# Patient Record
Sex: FEMALE | Race: BLACK OR AFRICAN AMERICAN | HISPANIC OR LATINO | Employment: UNEMPLOYED | URBAN - METROPOLITAN AREA
[De-identification: names, ages, dates, MRNs, and addresses within clinical notes are randomized per-mention and may not be internally consistent; named-entity substitution may affect disease eponyms.]

---

## 2024-02-03 PROCEDURE — 81025 URINE PREGNANCY TEST: CPT | Performed by: PHYSICIAN ASSISTANT

## 2024-02-03 PROCEDURE — 99285 EMERGENCY DEPT VISIT HI MDM: CPT

## 2024-02-03 PROCEDURE — 99222 1ST HOSP IP/OBS MODERATE 55: CPT | Mod: ,,, | Performed by: SURGERY

## 2024-02-04 ENCOUNTER — HOSPITAL ENCOUNTER (OUTPATIENT)
Facility: HOSPITAL | Age: 29
Discharge: HOME | End: 2024-02-04
Attending: EMERGENCY MEDICINE | Admitting: EMERGENCY MEDICINE

## 2024-02-04 ENCOUNTER — ANESTHESIA EVENT (OUTPATIENT)
Dept: SURGERY | Facility: HOSPITAL | Age: 29
End: 2024-02-04

## 2024-02-04 ENCOUNTER — ANESTHESIA (OUTPATIENT)
Dept: SURGERY | Facility: HOSPITAL | Age: 29
End: 2024-02-04

## 2024-02-04 VITALS
RESPIRATION RATE: 18 BRPM | DIASTOLIC BLOOD PRESSURE: 76 MMHG | OXYGEN SATURATION: 93 % | HEIGHT: 63 IN | SYSTOLIC BLOOD PRESSURE: 130 MMHG | WEIGHT: 268 LBS | HEART RATE: 71 BPM | BODY MASS INDEX: 47.48 KG/M2 | TEMPERATURE: 99 F

## 2024-02-04 DIAGNOSIS — K81.0 ACUTE CHOLECYSTITIS: Primary | ICD-10-CM

## 2024-02-04 LAB
ABO + RH BLD: NORMAL
ALBUMIN SERPL BCP-MCNC: 4.2 G/DL (ref 3.5–5.2)
ALP SERPL-CCNC: 98 U/L (ref 55–135)
ALT SERPL W/O P-5'-P-CCNC: 31 U/L (ref 10–44)
ANION GAP SERPL CALC-SCNC: 11 MMOL/L (ref 8–16)
AST SERPL-CCNC: 23 U/L (ref 10–40)
B-HCG UR QL: NEGATIVE
BACTERIA #/AREA URNS HPF: ABNORMAL /HPF
BASOPHILS # BLD AUTO: 0.05 K/UL (ref 0–0.2)
BASOPHILS NFR BLD: 0.6 % (ref 0–1.9)
BILIRUB SERPL-MCNC: 0.4 MG/DL (ref 0.1–1)
BILIRUB UR QL STRIP: NEGATIVE
BLD GP AB SCN CELLS X3 SERPL QL: NORMAL
BUN SERPL-MCNC: 10 MG/DL (ref 6–20)
CALCIUM SERPL-MCNC: 9.5 MG/DL (ref 8.7–10.5)
CHLORIDE SERPL-SCNC: 106 MMOL/L (ref 95–110)
CLARITY UR: ABNORMAL
CO2 SERPL-SCNC: 24 MMOL/L (ref 23–29)
COLOR UR: ABNORMAL
CREAT SERPL-MCNC: 0.8 MG/DL (ref 0.5–1.4)
CTP QC/QA: YES
DIFFERENTIAL METHOD BLD: ABNORMAL
EOSINOPHIL # BLD AUTO: 0.3 K/UL (ref 0–0.5)
EOSINOPHIL NFR BLD: 4.1 % (ref 0–8)
ERYTHROCYTE [DISTWIDTH] IN BLOOD BY AUTOMATED COUNT: 18.7 % (ref 11.5–14.5)
EST. GFR  (NO RACE VARIABLE): >60 ML/MIN/1.73 M^2
GLUCOSE SERPL-MCNC: 115 MG/DL (ref 70–110)
GLUCOSE UR QL STRIP: NEGATIVE
HCT VFR BLD AUTO: 40.4 % (ref 37–48.5)
HGB BLD-MCNC: 12.1 G/DL (ref 12–16)
HGB UR QL STRIP: ABNORMAL
HYALINE CASTS #/AREA URNS LPF: 0 /LPF
IMM GRANULOCYTES # BLD AUTO: 0.01 K/UL (ref 0–0.04)
IMM GRANULOCYTES NFR BLD AUTO: 0.1 % (ref 0–0.5)
KETONES UR QL STRIP: ABNORMAL
LEUKOCYTE ESTERASE UR QL STRIP: ABNORMAL
LIPASE SERPL-CCNC: 33 U/L (ref 4–60)
LYMPHOCYTES # BLD AUTO: 2.4 K/UL (ref 1–4.8)
LYMPHOCYTES NFR BLD: 30.5 % (ref 18–48)
MCH RBC QN AUTO: 21.8 PG (ref 27–31)
MCHC RBC AUTO-ENTMCNC: 30 G/DL (ref 32–36)
MCV RBC AUTO: 73 FL (ref 82–98)
MICROSCOPIC COMMENT: ABNORMAL
MONOCYTES # BLD AUTO: 0.4 K/UL (ref 0.3–1)
MONOCYTES NFR BLD: 5 % (ref 4–15)
NEUTROPHILS # BLD AUTO: 4.8 K/UL (ref 1.8–7.7)
NEUTROPHILS NFR BLD: 59.7 % (ref 38–73)
NITRITE UR QL STRIP: NEGATIVE
NRBC BLD-RTO: 0 /100 WBC
PH UR STRIP: 6 [PH] (ref 5–8)
PLATELET # BLD AUTO: 522 K/UL (ref 150–450)
PMV BLD AUTO: 8.8 FL (ref 9.2–12.9)
POTASSIUM SERPL-SCNC: 3.8 MMOL/L (ref 3.5–5.1)
PROT SERPL-MCNC: 8.3 G/DL (ref 6–8.4)
PROT UR QL STRIP: ABNORMAL
RBC # BLD AUTO: 5.55 M/UL (ref 4–5.4)
RBC #/AREA URNS HPF: >100 /HPF (ref 0–4)
SODIUM SERPL-SCNC: 141 MMOL/L (ref 136–145)
SP GR UR STRIP: 1.03 (ref 1–1.03)
SPECIMEN OUTDATE: NORMAL
SQUAMOUS #/AREA URNS HPF: 2 /HPF
URN SPEC COLLECT METH UR: ABNORMAL
UROBILINOGEN UR STRIP-ACNC: NEGATIVE EU/DL
WBC # BLD AUTO: 8 K/UL (ref 3.9–12.7)
WBC #/AREA URNS HPF: 2 /HPF (ref 0–5)

## 2024-02-04 PROCEDURE — 63600175 PHARM REV CODE 636 W HCPCS: Performed by: NURSE ANESTHETIST, CERTIFIED REGISTERED

## 2024-02-04 PROCEDURE — 88304 TISSUE EXAM BY PATHOLOGIST: CPT | Performed by: PATHOLOGY

## 2024-02-04 PROCEDURE — G0378 HOSPITAL OBSERVATION PER HR: HCPCS

## 2024-02-04 PROCEDURE — 63600175 PHARM REV CODE 636 W HCPCS: Mod: JG | Performed by: SURGERY

## 2024-02-04 PROCEDURE — 85025 COMPLETE CBC W/AUTO DIFF WBC: CPT | Performed by: PHYSICIAN ASSISTANT

## 2024-02-04 PROCEDURE — 71000033 HC RECOVERY, INTIAL HOUR: Performed by: SURGERY

## 2024-02-04 PROCEDURE — 25000003 PHARM REV CODE 250: Performed by: SURGERY

## 2024-02-04 PROCEDURE — 80053 COMPREHEN METABOLIC PANEL: CPT | Performed by: PHYSICIAN ASSISTANT

## 2024-02-04 PROCEDURE — 96375 TX/PRO/DX INJ NEW DRUG ADDON: CPT

## 2024-02-04 PROCEDURE — 63600175 PHARM REV CODE 636 W HCPCS

## 2024-02-04 PROCEDURE — 96361 HYDRATE IV INFUSION ADD-ON: CPT

## 2024-02-04 PROCEDURE — 36000710: Performed by: SURGERY

## 2024-02-04 PROCEDURE — 88304 TISSUE EXAM BY PATHOLOGIST: CPT | Mod: 26,,, | Performed by: PATHOLOGY

## 2024-02-04 PROCEDURE — 63600175 PHARM REV CODE 636 W HCPCS: Performed by: ANESTHESIOLOGY

## 2024-02-04 PROCEDURE — 37000008 HC ANESTHESIA 1ST 15 MINUTES: Performed by: SURGERY

## 2024-02-04 PROCEDURE — 83690 ASSAY OF LIPASE: CPT | Performed by: PHYSICIAN ASSISTANT

## 2024-02-04 PROCEDURE — 47562 LAPAROSCOPIC CHOLECYSTECTOMY: CPT | Mod: ,,, | Performed by: SURGERY

## 2024-02-04 PROCEDURE — 25000003 PHARM REV CODE 250: Performed by: NURSE ANESTHETIST, CERTIFIED REGISTERED

## 2024-02-04 PROCEDURE — 86850 RBC ANTIBODY SCREEN: CPT | Performed by: PHYSICIAN ASSISTANT

## 2024-02-04 PROCEDURE — 63600175 PHARM REV CODE 636 W HCPCS: Performed by: PHYSICIAN ASSISTANT

## 2024-02-04 PROCEDURE — 96365 THER/PROPH/DIAG IV INF INIT: CPT

## 2024-02-04 PROCEDURE — C9290 INJ, BUPIVACAINE LIPOSOME: HCPCS

## 2024-02-04 PROCEDURE — 27201423 OPTIME MED/SURG SUP & DEVICES STERILE SUPPLY: Performed by: SURGERY

## 2024-02-04 PROCEDURE — 25000003 PHARM REV CODE 250: Performed by: PHYSICIAN ASSISTANT

## 2024-02-04 PROCEDURE — D9220A PRA ANESTHESIA: Mod: ,,, | Performed by: ANESTHESIOLOGY

## 2024-02-04 PROCEDURE — 96374 THER/PROPH/DIAG INJ IV PUSH: CPT | Mod: 59

## 2024-02-04 PROCEDURE — 25000003 PHARM REV CODE 250: Performed by: ANESTHESIOLOGY

## 2024-02-04 PROCEDURE — 36000711: Performed by: SURGERY

## 2024-02-04 PROCEDURE — 81000 URINALYSIS NONAUTO W/SCOPE: CPT | Performed by: PHYSICIAN ASSISTANT

## 2024-02-04 PROCEDURE — 37000009 HC ANESTHESIA EA ADD 15 MINS: Performed by: SURGERY

## 2024-02-04 PROCEDURE — 96366 THER/PROPH/DIAG IV INF ADDON: CPT

## 2024-02-04 RX ORDER — HALOPERIDOL 5 MG/ML
0.5 INJECTION INTRAMUSCULAR EVERY 10 MIN PRN
Status: DISCONTINUED | OUTPATIENT
Start: 2024-02-04 | End: 2024-02-04

## 2024-02-04 RX ORDER — PROPOFOL 10 MG/ML
VIAL (ML) INTRAVENOUS
Status: DISCONTINUED | OUTPATIENT
Start: 2024-02-04 | End: 2024-02-04

## 2024-02-04 RX ORDER — KETOROLAC TROMETHAMINE 30 MG/ML
15 INJECTION, SOLUTION INTRAMUSCULAR; INTRAVENOUS
Status: COMPLETED | OUTPATIENT
Start: 2024-02-04 | End: 2024-02-04

## 2024-02-04 RX ORDER — ONDANSETRON 4 MG/1
8 TABLET, FILM COATED ORAL EVERY 6 HOURS PRN
Qty: 30 TABLET | Refills: 0 | Status: SHIPPED | OUTPATIENT
Start: 2024-02-04 | End: 2024-03-05

## 2024-02-04 RX ORDER — ONDANSETRON HYDROCHLORIDE 2 MG/ML
8 INJECTION, SOLUTION INTRAVENOUS
Status: COMPLETED | OUTPATIENT
Start: 2024-02-04 | End: 2024-02-04

## 2024-02-04 RX ORDER — INDOCYANINE GREEN AND WATER 25 MG
1.25 KIT INJECTION ONCE
Status: COMPLETED | OUTPATIENT
Start: 2024-02-04 | End: 2024-02-04

## 2024-02-04 RX ORDER — ACETAMINOPHEN 500 MG
1000 TABLET ORAL EVERY 8 HOURS
Refills: 0 | COMMUNITY
Start: 2024-02-04 | End: 2024-02-09

## 2024-02-04 RX ORDER — TALC
6 POWDER (GRAM) TOPICAL NIGHTLY PRN
Status: DISCONTINUED | OUTPATIENT
Start: 2024-02-04 | End: 2024-02-04 | Stop reason: HOSPADM

## 2024-02-04 RX ORDER — SCOLOPAMINE TRANSDERMAL SYSTEM 1 MG/1
PATCH, EXTENDED RELEASE TRANSDERMAL
Status: DISCONTINUED | OUTPATIENT
Start: 2024-02-04 | End: 2024-02-04

## 2024-02-04 RX ORDER — MORPHINE SULFATE 4 MG/ML
2 INJECTION, SOLUTION INTRAMUSCULAR; INTRAVENOUS EVERY 4 HOURS PRN
Status: DISCONTINUED | OUTPATIENT
Start: 2024-02-04 | End: 2024-02-04 | Stop reason: HOSPADM

## 2024-02-04 RX ORDER — HYDROMORPHONE HYDROCHLORIDE 2 MG/ML
0.2 INJECTION, SOLUTION INTRAMUSCULAR; INTRAVENOUS; SUBCUTANEOUS EVERY 5 MIN PRN
Status: DISCONTINUED | OUTPATIENT
Start: 2024-02-04 | End: 2024-02-04

## 2024-02-04 RX ORDER — MEPERIDINE HYDROCHLORIDE 50 MG/ML
12.5 INJECTION INTRAMUSCULAR; INTRAVENOUS; SUBCUTANEOUS EVERY 10 MIN PRN
Status: CANCELLED | OUTPATIENT
Start: 2024-02-04 | End: 2024-02-04

## 2024-02-04 RX ORDER — OXYCODONE HYDROCHLORIDE 5 MG/1
5 TABLET ORAL EVERY 4 HOURS PRN
Qty: 12 TABLET | Refills: 0 | Status: SHIPPED | OUTPATIENT
Start: 2024-02-04

## 2024-02-04 RX ORDER — ROCURONIUM BROMIDE 10 MG/ML
INJECTION, SOLUTION INTRAVENOUS
Status: DISCONTINUED | OUTPATIENT
Start: 2024-02-04 | End: 2024-02-04

## 2024-02-04 RX ORDER — ACETAMINOPHEN 500 MG
1000 TABLET ORAL EVERY 8 HOURS
Status: DISCONTINUED | OUTPATIENT
Start: 2024-02-04 | End: 2024-02-04 | Stop reason: HOSPADM

## 2024-02-04 RX ORDER — OXYCODONE HYDROCHLORIDE 5 MG/1
5 TABLET ORAL EVERY 4 HOURS PRN
Qty: 12 TABLET | Refills: 0 | Status: SHIPPED | OUTPATIENT
Start: 2024-02-04 | End: 2024-02-04

## 2024-02-04 RX ORDER — HYDROCODONE BITARTRATE AND ACETAMINOPHEN 5; 325 MG/1; MG/1
1 TABLET ORAL EVERY 6 HOURS PRN
Qty: 12 TABLET | Refills: 0 | Status: SHIPPED | OUTPATIENT
Start: 2024-02-04 | End: 2024-02-04 | Stop reason: HOSPADM

## 2024-02-04 RX ORDER — DEXAMETHASONE SODIUM PHOSPHATE 4 MG/ML
INJECTION, SOLUTION INTRA-ARTICULAR; INTRALESIONAL; INTRAMUSCULAR; INTRAVENOUS; SOFT TISSUE
Status: DISCONTINUED | OUTPATIENT
Start: 2024-02-04 | End: 2024-02-04

## 2024-02-04 RX ORDER — DIPHENHYDRAMINE HYDROCHLORIDE 50 MG/ML
25 INJECTION INTRAMUSCULAR; INTRAVENOUS EVERY 6 HOURS PRN
Status: CANCELLED | OUTPATIENT
Start: 2024-02-04

## 2024-02-04 RX ORDER — LORAZEPAM 2 MG/ML
0.25 INJECTION INTRAMUSCULAR ONCE AS NEEDED
Status: CANCELLED | OUTPATIENT
Start: 2024-02-04 | End: 2035-07-02

## 2024-02-04 RX ORDER — ONDANSETRON HYDROCHLORIDE 2 MG/ML
4 INJECTION, SOLUTION INTRAVENOUS EVERY 8 HOURS PRN
Status: DISCONTINUED | OUTPATIENT
Start: 2024-02-04 | End: 2024-02-04 | Stop reason: HOSPADM

## 2024-02-04 RX ORDER — HYDROMORPHONE HYDROCHLORIDE 2 MG/ML
0.2 INJECTION, SOLUTION INTRAMUSCULAR; INTRAVENOUS; SUBCUTANEOUS EVERY 5 MIN PRN
Status: CANCELLED | OUTPATIENT
Start: 2024-02-04

## 2024-02-04 RX ORDER — BUPIVACAINE HYDROCHLORIDE 2.5 MG/ML
INJECTION, SOLUTION INFILTRATION; PERINEURAL
Status: DISCONTINUED | OUTPATIENT
Start: 2024-02-04 | End: 2024-02-04 | Stop reason: HOSPADM

## 2024-02-04 RX ORDER — LIDOCAINE HYDROCHLORIDE 20 MG/ML
INJECTION INTRAVENOUS
Status: DISCONTINUED | OUTPATIENT
Start: 2024-02-04 | End: 2024-02-04

## 2024-02-04 RX ORDER — FENTANYL CITRATE 50 UG/ML
INJECTION, SOLUTION INTRAMUSCULAR; INTRAVENOUS
Status: DISCONTINUED | OUTPATIENT
Start: 2024-02-04 | End: 2024-02-04

## 2024-02-04 RX ORDER — FAMOTIDINE 20 MG/1
20 TABLET, FILM COATED ORAL 2 TIMES DAILY
Status: DISCONTINUED | OUTPATIENT
Start: 2024-02-04 | End: 2024-02-04 | Stop reason: HOSPADM

## 2024-02-04 RX ORDER — ONDANSETRON HYDROCHLORIDE 2 MG/ML
4 INJECTION, SOLUTION INTRAVENOUS DAILY PRN
Status: DISCONTINUED | OUTPATIENT
Start: 2024-02-04 | End: 2024-02-04

## 2024-02-04 RX ORDER — MIDAZOLAM HYDROCHLORIDE 1 MG/ML
INJECTION, SOLUTION INTRAMUSCULAR; INTRAVENOUS
Status: DISCONTINUED | OUTPATIENT
Start: 2024-02-04 | End: 2024-02-04

## 2024-02-04 RX ORDER — PHENYLEPHRINE HYDROCHLORIDE 10 MG/ML
INJECTION INTRAVENOUS
Status: DISCONTINUED | OUTPATIENT
Start: 2024-02-04 | End: 2024-02-04

## 2024-02-04 RX ORDER — ONDANSETRON HYDROCHLORIDE 2 MG/ML
INJECTION, SOLUTION INTRAVENOUS
Status: DISCONTINUED | OUTPATIENT
Start: 2024-02-04 | End: 2024-02-04

## 2024-02-04 RX ORDER — SODIUM CHLORIDE, SODIUM LACTATE, POTASSIUM CHLORIDE, CALCIUM CHLORIDE 600; 310; 30; 20 MG/100ML; MG/100ML; MG/100ML; MG/100ML
INJECTION, SOLUTION INTRAVENOUS CONTINUOUS
Status: DISCONTINUED | OUTPATIENT
Start: 2024-02-04 | End: 2024-02-04

## 2024-02-04 RX ORDER — MORPHINE SULFATE 4 MG/ML
4 INJECTION, SOLUTION INTRAMUSCULAR; INTRAVENOUS
Status: COMPLETED | OUTPATIENT
Start: 2024-02-04 | End: 2024-02-04

## 2024-02-04 RX ORDER — SODIUM CHLORIDE 0.9 % (FLUSH) 0.9 %
10 SYRINGE (ML) INJECTION
Status: DISCONTINUED | OUTPATIENT
Start: 2024-02-04 | End: 2024-02-04

## 2024-02-04 RX ORDER — KETOROLAC TROMETHAMINE 10 MG/1
10 TABLET, FILM COATED ORAL EVERY 6 HOURS
Qty: 20 TABLET | Refills: 0 | Status: SHIPPED | OUTPATIENT
Start: 2024-02-04 | End: 2024-02-04 | Stop reason: HOSPADM

## 2024-02-04 RX ORDER — MORPHINE SULFATE 4 MG/ML
4 INJECTION, SOLUTION INTRAMUSCULAR; INTRAVENOUS EVERY 4 HOURS PRN
Status: DISCONTINUED | OUTPATIENT
Start: 2024-02-04 | End: 2024-02-04 | Stop reason: HOSPADM

## 2024-02-04 RX ORDER — SODIUM CHLORIDE 0.9 % (FLUSH) 0.9 %
10 SYRINGE (ML) INJECTION
Status: DISCONTINUED | OUTPATIENT
Start: 2024-02-04 | End: 2024-02-04 | Stop reason: HOSPADM

## 2024-02-04 RX ORDER — KETOROLAC TROMETHAMINE 30 MG/ML
INJECTION, SOLUTION INTRAMUSCULAR; INTRAVENOUS
Status: DISCONTINUED | OUTPATIENT
Start: 2024-02-04 | End: 2024-02-04

## 2024-02-04 RX ADMIN — KETOROLAC TROMETHAMINE 30 MG: 30 INJECTION, SOLUTION INTRAMUSCULAR; INTRAVENOUS at 09:02

## 2024-02-04 RX ADMIN — KETOROLAC TROMETHAMINE 15 MG: 30 INJECTION, SOLUTION INTRAMUSCULAR; INTRAVENOUS at 01:02

## 2024-02-04 RX ADMIN — INDOCYANINE GREEN AND WATER 1.25 MG: KIT at 07:02

## 2024-02-04 RX ADMIN — DEXAMETHASONE SODIUM PHOSPHATE 4 MG: 4 INJECTION, SOLUTION INTRAMUSCULAR; INTRAVENOUS at 08:02

## 2024-02-04 RX ADMIN — HYDROMORPHONE HYDROCHLORIDE 0.2 MG: 2 INJECTION INTRAMUSCULAR; INTRAVENOUS; SUBCUTANEOUS at 10:02

## 2024-02-04 RX ADMIN — FAMOTIDINE 20 MG: 20 TABLET ORAL at 01:02

## 2024-02-04 RX ADMIN — MIDAZOLAM HYDROCHLORIDE 2 MG: 1 INJECTION, SOLUTION INTRAMUSCULAR; INTRAVENOUS at 08:02

## 2024-02-04 RX ADMIN — SUGAMMADEX 200 MG: 100 INJECTION, SOLUTION INTRAVENOUS at 09:02

## 2024-02-04 RX ADMIN — MORPHINE SULFATE 4 MG: 4 INJECTION, SOLUTION INTRAMUSCULAR; INTRAVENOUS at 01:02

## 2024-02-04 RX ADMIN — ACETAMINOPHEN 1000 MG: 500 TABLET ORAL at 04:02

## 2024-02-04 RX ADMIN — ROCURONIUM BROMIDE 20 MG: 10 INJECTION, SOLUTION INTRAVENOUS at 08:02

## 2024-02-04 RX ADMIN — PROPOFOL 20 MG: 10 INJECTION, EMULSION INTRAVENOUS at 08:02

## 2024-02-04 RX ADMIN — SODIUM CHLORIDE 1000 ML: 9 INJECTION, SOLUTION INTRAVENOUS at 01:02

## 2024-02-04 RX ADMIN — FENTANYL CITRATE 100 MCG: 0.05 INJECTION, SOLUTION INTRAMUSCULAR; INTRAVENOUS at 08:02

## 2024-02-04 RX ADMIN — SCOPALAMINE 1 PATCH: 1 PATCH, EXTENDED RELEASE TRANSDERMAL at 07:02

## 2024-02-04 RX ADMIN — PIPERACILLIN AND TAZOBACTAM 4.5 G: 4; .5 INJECTION, POWDER, LYOPHILIZED, FOR SOLUTION INTRAVENOUS; PARENTERAL at 03:02

## 2024-02-04 RX ADMIN — ACETAMINOPHEN 1000 MG: 500 TABLET ORAL at 01:02

## 2024-02-04 RX ADMIN — PHENYLEPHRINE HYDROCHLORIDE 100 MCG: 10 INJECTION INTRAVENOUS at 08:02

## 2024-02-04 RX ADMIN — SODIUM CHLORIDE, SODIUM LACTATE, POTASSIUM CHLORIDE, AND CALCIUM CHLORIDE: .6; .31; .03; .02 INJECTION, SOLUTION INTRAVENOUS at 07:02

## 2024-02-04 RX ADMIN — ONDANSETRON 4 MG: 2 INJECTION, SOLUTION INTRAMUSCULAR; INTRAVENOUS at 08:02

## 2024-02-04 RX ADMIN — GLYCOPYRROLATE 0.1 MG: 0.2 INJECTION, SOLUTION INTRAMUSCULAR; INTRAVITREAL at 08:02

## 2024-02-04 RX ADMIN — SODIUM CHLORIDE, POTASSIUM CHLORIDE, SODIUM LACTATE AND CALCIUM CHLORIDE: 600; 310; 30; 20 INJECTION, SOLUTION INTRAVENOUS at 05:02

## 2024-02-04 RX ADMIN — ONDANSETRON 8 MG: 2 INJECTION INTRAMUSCULAR; INTRAVENOUS at 01:02

## 2024-02-04 RX ADMIN — LIDOCAINE HYDROCHLORIDE 100 MG: 20 INJECTION, SOLUTION INTRAVENOUS at 08:02

## 2024-02-04 RX ADMIN — ROCURONIUM BROMIDE 50 MG: 10 INJECTION, SOLUTION INTRAVENOUS at 08:02

## 2024-02-04 RX ADMIN — SODIUM CHLORIDE, SODIUM LACTATE, POTASSIUM CHLORIDE, AND CALCIUM CHLORIDE: .6; .31; .03; .02 INJECTION, SOLUTION INTRAVENOUS at 09:02

## 2024-02-04 RX ADMIN — PROPOFOL 180 MG: 10 INJECTION, EMULSION INTRAVENOUS at 08:02

## 2024-02-04 NOTE — DISCHARGE INSTRUCTIONS
Uli por venir a nuestro Departamento de Emergencias hoy. Es importante recordar que algunos problemas son difíciles de diagnosticar y es posible que no se detecten manan torres primera visita. Asegúrese de hacer un seguimiento con torres médico de atención primaria.  Si no tiene tonio, puede comunicarse con el que figura en torres documentación de carlos o también puede llamar a la [facility] al [phone] para programar annemarie muna con tonio.    Regrese a la kristin de emergencias con cualquier pregunta / inquietud, síntomas nuevos / preocupantes, empeoramiento o falta de mejora. No conduzca ni tome decisiones importantes manan 24 horas si ha recibido analgésicos, sedantes o fármacos que alteran el estado de ánimo manan torres visita a la kristin de emergencias.                                                 -Exparel information-      To help control your pain after surgery, your surgeon injected Exparel (bupicacaine liposome injectable suspension) into your surgical incision just before the end of the procedure.      Exparel is a local analgesic that contains the local anesthetic bupivacaine..  Local anesthetics provide pain relief by numbing the tissue around the surgical site.    Exparel is specifically designed to release pain medication over time an can control pain for up to 72 hours.    In addition to Exparel, your surgeon may provide other pain medications to control your pain.    Each patient is different and responds differently to pain medication.  Depending on how you respond to Exparel, you may require less additional pain medication during your recovery.    Side effects can occur with any medication and it is important not to ignore anything you may be experiencing.  Some patients who received Exparel experienced nausea, vomiting, or constipation.  Rarely, patients who receive bupivacaine (the active ingredient in Exparel) have experienced numbness and tingling in their mouth or lips,  lightheadedness, or anxiety.  Speak with your doctor right away if you think you may be experiencing any of these sensations, or if you have other questions regarding possible side effects.    Products that contain bupivacaine, like Exparel, may cause a temporary loss of sensation or the ability to move in the area where bupivacaine was injected.    Other formulations of bupivacaine should not be administered within 96 hours following administrations of Exparel.      Do not remove the teal colored bracelet that you have on for 96 hours.  (4 DAYS)    This bracelet will let other health care workers know that you have received Exparel, and not to give you bupivacaine during this 96 hours.

## 2024-02-04 NOTE — OP NOTE
PATIENT: Evan Teresa    MRN: [id number]    DATE OF PROCEDURE: 02/04/2024    PREOPERATIVE DIAGNOSIS: Acute cholecystitis    POSTOPERATIVE DIAGNOSIS: same    PROCEDURE: Robotic cholecystectomy with Intraoperative cholangiography    SURGEON: Cameron Martinez M.D.    ASSISTANT: Francy Meeks M.D.    ANESTHESIA: General Endotracheal    ESTIMATED BLOOD LOSS: minimal    SPECIMEN: Gallbladder and contents    COMPLICATIONS: None    INDICATIONS: The patient is 30 yo female. R/B/A to Robotic cholecystectomy surgery were explained to the patient in detail.  The risks include, but are not limited to: bleeding, infection, re-operation, injury to surrounding structures,reaction to anesthesia, darnell-operative cardiopulmonary events including PE/DVT, bile leak, post-op diarrhea, failure to resolve symptoms, and possible death.  The patient stated a clear understanding of the risks and requests the procedure be done.    PROCEDURE IN DETAIL:  After surgical consent was obtained, the patient was transported to the operative theater and onto the operating room table in a supine position.  General endotracheal anesthesia was administered without difficulty.  The patient's abdomen was prepped and draped in a standard sterile fashion.  Appropriate perioperative antibiotics were given and a time-out was performed.  An incision was made in the periumbilical region and the fascia was elevated after blunt dissection.  It was sharply divided and the abdominal cavity was entered bluntly.  A trocar was inserted in the abdomen and it was insufflated.  Additional robotic trocars were inserted without difficulty and under direct visualization and the robot was docked.  The gallbladder was noted to be distended and inflamed.  Suction irrigation was used to remove bile from within the lumen to facilitate mobilization.  The dome of the gallbladder was identified and elevated anteriorly and towards the patient's right shoulder.  The peritoneum  was scored on both sides of the infundibulum and a critical view of safety was obtained.  ICG/Firefly technology was used for cholangiography.  The cystic duct and common bile duct were illuminated and their anatomical relationship confirmed.  The cystic duct and cystic artery were the only structures seen entering the gallbladder.  They were clipped and divided.  The gallbladder was then taken off the liver bed using cautery.  The liver bed was found to be free of bleeding or bile leakage.  The right upper quadrant was irrigated with sterile saline.  The robot was undocked and the gallbladder was placed in a specimen bag and removed from the field without difficulty. The trocars were removed under direct visualization and the abdomen was desufflated.  The fascial defect at the umbilicus was closed using a figure-of-eight 0 Vicryl suture.  Incisions were all irrigated out with sterile saline and injected with local anesthetic.  The skin openings were closed with interrupted 4 0 Monocryl suture and the incisions were all dressed in a standard sterile fashion.  At the end of the procedure the instrument, lap, and needle counts were all correct.  The patient was awoken from anesthesia having tolerated the procedure without difficulty and was returned to the PACU in a stable condition.  Patient's family was updated at the end of the procedure and all questions were answered.      Cameron Martinez M.D., F.A.C.S.  [facility] and General Surgery  [facility]

## 2024-02-04 NOTE — TRANSFER OF CARE
"Anesthesia Transfer of Care Note    Patient: Evan Teresa    Procedure(s) Performed: Procedure(s) (LRB):  ROBOTIC CHOLECYSTECTOMY (N/A)    Patient location: PACU    Anesthesia Type: general    Transport from OR: Transported from OR on 100% O2 by closed face mask with adequate spontaneous ventilation    Post pain: adequate analgesia    Post assessment: no apparent anesthetic complications and tolerated procedure well    Post vital signs: stable    Level of consciousness: sedated and responds to stimulation    Nausea/Vomiting: no nausea/vomiting    Complications: none    Transfer of care protocol was followed      Last vitals: Visit Vitals  /64 (BP Location: Left arm)   Pulse 82   Temp 36.4 °C (97.5 °F) (Temporal)   Resp 19   Ht 5' 3.39" (1.61 m)   Wt 121.6 kg (268 lb)   LMP 02/01/2024   SpO2 100%   Breastfeeding No   BMI 46.90 kg/m²     "

## 2024-02-04 NOTE — PLAN OF CARE
Case Management Final Discharge Note      Discharge Disposition: home    New DME ordered / company name: none    Relevant SDOH / Transition of Care Barriers:  n/a    Primary Caretaker and contact information: n/a    Scheduled followup appointment: patient to self schedule due to weekend DC    Referrals placed: per MD    Transportation: family        Patient and family educated on discharge services and updated on DC plan. Bedside RN notified, patient clear to discharge from Case Management Perspective.        02/04/24 1102   Final Note   Assessment Type Final Discharge Note   Anticipated Discharge Disposition Home   Hospital Resources/Appts/Education Provided Appointment suggestion unavailable   Post-Acute Status   Post-Acute Authorization Other   Other Status No Post-Acute Service Needs   Discharge Delays None known at this time

## 2024-02-04 NOTE — ASSESSMENT & PLAN NOTE
28 yo female with biliary colic vs early cholecystitis    - to OR for robotic cholecystectomy  - consent obtained  - mIVF, NPO, IV medications for pain/nausea  - DVT ppx  - Likely discharge home following surgery

## 2024-02-04 NOTE — H&P
[facility]  General Surgery  History & Physical    Patient Name: Evan Teresa  MRN: [id number]  Admission Date: 2/4/2024  Attending Physician: Cameron Martinez MD   Primary Care Provider: Misty Primary Doctor    Patient information was obtained from patient, past medical records, and ER records.     Subjective:     Chief Complaint/Reason for Admission: Abdominal pain     History of Present Illness: 29 year old obese female who has no diagnosed medical problems who presents with right upper quadrant and epigastric abdominal pain, nausea, vomiting.     Vitals and labs largely normal on admission; US with 1.4 cm gallstone, distended gallbladder without over signs of cholecystitis. Pain refractory to pain medication in ED so general surgery consulted for consideration of inpatient cholecystectomy for biliary colic     No current facility-administered medications on file prior to encounter.     No current outpatient medications on file prior to encounter.       Review of patient's allergies indicates:  No Known Allergies    History reviewed. No pertinent past medical history.  History reviewed. No pertinent surgical history.  Family History    None       Tobacco Use    Smoking status: Never    Smokeless tobacco: Never   Substance and Sexual Activity    Alcohol use: Yes     Comment: occ    Drug use: Never    Sexual activity: Yes     Partners: Male     Review of Systems   Constitutional:  Positive for chills and fatigue.   Gastrointestinal:  Positive for abdominal distention, abdominal pain, nausea and vomiting.   All other systems reviewed and are negative.    Objective:     Vital Signs (Most Recent):  Temp: 97.5 °F (36.4 °C) (02/04/24 0950)  Pulse: 77 (02/04/24 1000)  Resp: (!) 28 (02/04/24 1021)  BP: 117/65 (02/04/24 1000)  SpO2: 100 % (02/04/24 1000) Vital Signs (24h Range):  Temp:  [97.5 °F (36.4 °C)-98.9 °F (37.2 °C)] 97.5 °F (36.4 °C)  Pulse:  [60-82] 77  Resp:  [16-28] 28  SpO2:  [97 %-100 %] 100  %  BP: (116-139)/(57-78) 117/65     Weight: 121.6 kg (268 lb)  Body mass index is 46.9 kg/m².     Physical Exam  Vitals and nursing note reviewed.   Constitutional:       Appearance: Normal appearance.   HENT:      Head: Normocephalic and atraumatic.   Cardiovascular:      Rate and Rhythm: Normal rate and regular rhythm.   Pulmonary:      Effort: Pulmonary effort is normal. No respiratory distress.   Abdominal:      General: Abdomen is flat. There is no distension.      Palpations: Abdomen is soft. There is no mass.      Tenderness: There is abdominal tenderness (epigastric and right upper quadrant tenderness to deep palpation).      Hernia: No hernia is present.   Musculoskeletal:      Right lower leg: No edema.      Left lower leg: No edema.   Skin:     General: Skin is warm and dry.   Neurological:      General: No focal deficit present.      Mental Status: She is alert and oriented to person, place, and time.   Psychiatric:         Mood and Affect: Mood normal.         Behavior: Behavior normal.            I have reviewed all pertinent lab results within the past 24 hours.    Significant Diagnostics:  I have reviewed all pertinent imaging results/findings within the past 24 hours.    Assessment/Plan:     * Acute cholecystitis  28 yo female with biliary colic vs early cholecystitis    - to OR for robotic cholecystectomy  - consent obtained  - mIVF, NPO, IV medications for pain/nausea  - DVT ppx  - Likely discharge home following surgery       VTE Risk Mitigation (From admission, onward)           Ordered     Place HIRO hose  Until discontinued         02/04/24 0255     IP VTE HIGH RISK PATIENT  Once         02/04/24 0255     Place sequential compression device  Until discontinued         02/04/24 0255                    Francy Meeks MD  General Surgery  [facility] [place] [facility]

## 2024-02-04 NOTE — Clinical Note
"Evan Suh" Anival Teresa was seen and treated in our emergency department on 2/3/2024.  She may return to work on 02/06/2024.       If you have any questions or concerns, please don't hesitate to call.      Ry Munguia PA-C"

## 2024-02-04 NOTE — NURSING
Partial surgery wipe complete as ordered . IC green given as ordered. Transferred to surgery via stretcher

## 2024-02-04 NOTE — NURSING
AVS virtually reviewed with patient in its entirety via  #[id number], with emphasis on medications, follow-up appointments and reasons to return to the ED or contact the [facility] On [facility] Nurse Care Line. Patient also encouraged to utilize their patient portal. Ease and convenience of use reiterated. Education complete and patient voiced understanding. All questions answered. Discharge teaching complete.

## 2024-02-04 NOTE — ED PROVIDER NOTES
Encounter Date: 2/3/2024       History     Chief Complaint   Patient presents with    Abdominal Pain     Pt presents to ED c/o gen abd pain, nausea and 3 non bloody emesis x 1 hr pta.  Denies cp, sob, vaginal bleeding, vaginal discharge, or any other symptoms.  Denies taking medication pta.  Pain 10/10.       29-year-old Hungarian-speaking female with no pertinent past medical history presents to the emergency department for acute onset atraumatic epigastric abdominal pain that radiates to the right upper quadrant and occurred after eating several hours ago.  Notes associated nausea without emesis.  Denies fever, chest pain, shortness of breath.  No history of similar symptoms.  Denies lower abdominal pain, urinary symptoms, diarrhea, constipation, and medication prior to arrival.  No prior abdominal surgical history.    The history is provided by the patient. The history is limited by a language barrier. A  was used ([id number]).     Review of patient's allergies indicates:  No Known Allergies  History reviewed. No pertinent past medical history.  History reviewed. No pertinent surgical history.  No family history on file.  Social History     Tobacco Use    Smoking status: Never    Smokeless tobacco: Never   Substance Use Topics    Alcohol use: Yes     Comment: occ    Drug use: Never     Review of Systems   Constitutional:  Negative for fever.   HENT:  Negative for congestion, sore throat and trouble swallowing.    Respiratory:  Negative for cough and shortness of breath.    Cardiovascular:  Negative for chest pain.   Gastrointestinal:  Positive for abdominal pain and nausea. Negative for constipation, diarrhea and vomiting.   Genitourinary:  Negative for dysuria, flank pain, frequency and urgency.   Musculoskeletal:  Negative for back pain.   Skin:  Negative for rash.   Neurological:  Negative for headaches.   All other systems reviewed and are negative.      Physical Exam     Initial Vitals  [02/03/24 2347]   BP Pulse Resp Temp SpO2   125/78 67 18 98.3 °F (36.8 °C) 97 %      MAP       --         Physical Exam    Nursing note and vitals reviewed.  Constitutional: She appears well-developed and well-nourished. She is not diaphoretic.   Appears uncomfortable.  Body mass index is 46.9 kg/m².   HENT:   Head: Atraumatic.   Right Ear: External ear normal.   Left Ear: External ear normal.   Eyes: Conjunctivae and EOM are normal.   Neck: No tracheal deviation present.   Normal range of motion.  Cardiovascular:  Normal rate and regular rhythm.           Pulmonary/Chest: No accessory muscle usage or stridor. No tachypnea. No respiratory distress.   Abdominal: Abdomen is soft. She exhibits no distension. There is abdominal tenderness in the right upper quadrant and epigastric area.   No right CVA tenderness.  No left CVA tenderness. There is positive Gee's sign. There is no rebound, no guarding and no tenderness at McBurney's point. negative Rovsing's sign  Musculoskeletal:         General: No edema. Normal range of motion.      Cervical back: Normal range of motion.     Neurological: She is alert and oriented to person, place, and time. She displays no tremor. She displays no seizure activity. Coordination and gait normal.   Skin: Skin is intact. Capillary refill takes less than 2 seconds. No rash noted. No erythema.         ED Course   Procedures  Labs Reviewed   URINALYSIS, REFLEX TO URINE CULTURE - Abnormal; Notable for the following components:       Result Value    Color, UA Orange (*)     Appearance, UA Hazy (*)     Protein, UA 1+ (*)     Ketones, UA Trace (*)     Occult Blood UA 3+ (*)     Leukocytes, UA 1+ (*)     All other components within normal limits    Narrative:     Specimen Source->Urine   URINALYSIS MICROSCOPIC - Abnormal; Notable for the following components:    RBC, UA >100 (*)     All other components within normal limits    Narrative:     Specimen Source->Urine   CBC W/ AUTO DIFFERENTIAL -  Abnormal; Notable for the following components:    RBC 5.55 (*)     MCV 73 (*)     MCH 21.8 (*)     MCHC 30.0 (*)     RDW 18.7 (*)     Platelets 522 (*)     MPV 8.8 (*)     All other components within normal limits   COMPREHENSIVE METABOLIC PANEL - Abnormal; Notable for the following components:    Glucose 115 (*)     All other components within normal limits   LIPASE   POCT URINE PREGNANCY   TYPE & SCREEN          Imaging Results               US Abdomen Limited (Final result)  Result time 02/04/24 02:09:32      Final result by Brittaney Chang MD (02/04/24 02:09:32)                   Impression:      Distended gallbladder, cholelithiasis and reported positive sonographic Gee sign.  This constellation of findings can be seen in acute cholecystitis in the appropriate clinical setting.  Clinical correlation advised.  Surgical consultation recommended.      Electronically signed by: Brittaney Chang MD  Date:    02/04/2024  Time:    02:09               Narrative:    EXAMINATION:  US ABDOMEN LIMITED    CLINICAL HISTORY:  Abdominal Pain - Gallbladder;    TECHNIQUE:  Limited ultrasound of the right upper quadrant of the abdomen focused on the biliary system was performed.    COMPARISON:  None    FINDINGS:  Gallbladder: The gallbladder is distended.  There are multiple mobile stones in the gallbladder lumen the largest measuring 1.4 x 1.4 x 1.0 cm.  No significant gallbladder wall thickening or gallbladder wall hyperemia.  Sonographic Gee sign is reported to be positive ultrasound technologist.    Biliary system: The common duct is not dilated, measuring 5 mm.  No intrahepatic ductal dilatation.    Pancreas: Obscured by bowel gas artifact.  Unremarkable in its visualized extent.    Miscellaneous: No upper abdominal ascites and no abnormalities of the visualized liver.  No evidence of right-sided hydronephrosis.    This report was flagged in Epic as abnormal.                                       Medications   sodium  chloride 0.9% flush 10 mL (has no administration in time range)   melatonin tablet 6 mg (has no administration in time range)   lactated ringers infusion (has no administration in time range)   piperacillin-tazobactam (ZOSYN) 4.5 g in dextrose 5 % in water (D5W) 100 mL IVPB (MB+) (has no administration in time range)   morphine injection 2 mg (has no administration in time range)   morphine injection 4 mg (has no administration in time range)   famotidine tablet 20 mg (has no administration in time range)   ondansetron injection 4 mg (has no administration in time range)   indocyanine green injection 1.25 mg (has no administration in time range)   sodium chloride 0.9% bolus 1,000 mL 1,000 mL (0 mLs Intravenous Stopped 2/4/24 0219)   morphine injection 4 mg (4 mg Intravenous Given 2/4/24 0122)   ketorolac injection 15 mg (15 mg Intravenous Given 2/4/24 0122)   ondansetron injection 8 mg (8 mg Intravenous Given 2/4/24 0121)     Medical Decision Making  Presentation concerning for acute cholecystitis.  Multiple gallstones with largest measuring 1.5 cm.  No dilation of CBD.  No cholangitis.  Pain improved in the ED but not completely resolved.  Remains tender in the right upper quadrant.  Case discussed with General surgery resident, Dr. Meeks; will place in observation for general surgery evaluation and consideration of acute cholecystectomy.  Started on antibiotics at surgery's request.  Patient agreeable to plan.    Amount and/or Complexity of Data Reviewed  Labs: ordered.  Radiology: ordered.    Risk  OTC drugs.  Prescription drug management.               ED Course as of 02/04/24 0258   Sun Feb 04, 2024   9999 I have reviewed the case with the NAVEEN and agree with the history, review of systems, physical exam, assessment, and plan of care as documented. I have also physically saw the patient and performed an independent HPI and exam. I was immediately available for any procedures.       29-year-old female  presenting with epigastric and right upper quadrant abdominal pain.    Physical exam   Sitting upright, conversational.  Tenderness to palpation in the epigastric and right upper quadrant region.  Positive Gee's sign.  No abdominal masses palpated.  No guarding rebound or rigidity.    MDM   Based on history of epigastric and right upper quadrant pain concern for biliary colic.  Patient has physical exam findings positive for Gee's sign.  Ultrasound shows sonographic positive Gee sign.  No gallbladder wall thickening or pericholecystic fluid.  Given clinical history an ultrasound findings there was concern for signs of cholecystitis.      Differential diagnosis includes cholecystitis, choledocholithiasis, cholelithiasis, pancreatitis      Saurabh Pozo       []      ED Course User Index  [JM] Saurabh Pozo MD                           Clinical Impression:  Final diagnoses:  [K81.0] Acute cholecystitis (Primary)          ED Disposition Condition    Observation Stable                Ry Munguia, HADLEY  02/04/24 0259

## 2024-02-04 NOTE — NURSING
[facility] [place]  Nurses Note -- 4 Eyes    Received pt from ER, alert and oriented. On RA not in distress. Mohawk speaking, used  to communicate. Vital signs taken and recorded. On NPO except meds, informed pt done. For poss abi today, surgical bath done care of patient. With 20g on L FA, with ongoing LR x 100, infusing well. Able to walk independently. Room oriented. HOB elevated. Bed in low position. Call light within reach.  2/4/2024       Skin assessed on: Admit      [x] No Pressure Injuries Present    [x]Prevention Measures Documented    [] Yes LDA  for Pressure Injury Previously documented     [] Yes New Pressure Injury Discovered   [] LDA for New Pressure Injury Added      Attending RN:  Rodriguez Scott RN     Second RN:  CHACHO Mendez

## 2024-02-04 NOTE — PLAN OF CARE
Problem: Adult Inpatient Plan of Care  Goal: Plan of Care Review  Outcome: Ongoing, Progressing  Flowsheets (Taken 2/4/2024 0621)  Plan of Care Reviewed With: patient  Goal: Optimal Comfort and Wellbeing  Outcome: Ongoing, Progressing  Intervention: Monitor Pain and Promote Comfort  Flowsheets (Taken 2/4/2024 0621)  Pain Management Interventions:   position adjusted   pillow support provided   quiet environment facilitated     Problem: Bariatric Environmental Safety  Goal: Safety Maintained with Care  Outcome: Ongoing, Progressing

## 2024-02-04 NOTE — ANESTHESIA POSTPROCEDURE EVALUATION
Anesthesia Post Evaluation    Patient: Evan Teresa    Procedure(s) Performed: Procedure(s) (LRB):  ROBOTIC CHOLECYSTECTOMY (N/A)    Final Anesthesia Type: general      Patient location during evaluation: PACU  Patient participation: Yes- Able to Participate  Level of consciousness: awake and alert and oriented  Post-procedure vital signs: reviewed and stable  Pain management: adequate  Airway patency: patent    PONV status at discharge: No PONV  Anesthetic complications: no      Cardiovascular status: hemodynamically stable and blood pressure returned to baseline  Respiratory status: spontaneous ventilation, room air and unassisted  Hydration status: euvolemic  Follow-up not needed.              Vitals Value Taken Time   /66 02/04/24 1047   Temp 36.4 °C (97.5 °F) 02/04/24 1045   Pulse 80 02/04/24 1051   Resp 33 02/04/24 1051   SpO2 92 % 02/04/24 1051   Vitals shown include unvalidated device data.      Event Time   Out of Recovery 11:00:32         Pain/Hermann Score: Pain Rating Prior to Med Admin: 4 (2/4/2024 10:21 AM)  Pain Rating Post Med Admin: 2 (2/4/2024 10:30 AM)  Hermann Score: 9 (2/4/2024 10:45 AM)

## 2024-02-04 NOTE — PLAN OF CARE
Pt with scop patch in place, Exparel bracelet secure, easily aroused, reports minimal pain. Transfer back to room 403. VS WNL

## 2024-02-04 NOTE — SUBJECTIVE & OBJECTIVE
No current facility-administered medications on file prior to encounter.     No current outpatient medications on file prior to encounter.       Review of patient's allergies indicates:  No Known Allergies    History reviewed. No pertinent past medical history.  History reviewed. No pertinent surgical history.  Family History    None       Tobacco Use    Smoking status: Never    Smokeless tobacco: Never   Substance and Sexual Activity    Alcohol use: Yes     Comment: occ    Drug use: Never    Sexual activity: Yes     Partners: Male     Review of Systems   Constitutional:  Positive for chills and fatigue.   Gastrointestinal:  Positive for abdominal distention, abdominal pain, nausea and vomiting.   All other systems reviewed and are negative.    Objective:     Vital Signs (Most Recent):  Temp: 97.5 °F (36.4 °C) (02/04/24 0950)  Pulse: 77 (02/04/24 1000)  Resp: (!) 28 (02/04/24 1021)  BP: 117/65 (02/04/24 1000)  SpO2: 100 % (02/04/24 1000) Vital Signs (24h Range):  Temp:  [97.5 °F (36.4 °C)-98.9 °F (37.2 °C)] 97.5 °F (36.4 °C)  Pulse:  [60-82] 77  Resp:  [16-28] 28  SpO2:  [97 %-100 %] 100 %  BP: (116-139)/(57-78) 117/65     Weight: 121.6 kg (268 lb)  Body mass index is 46.9 kg/m².     Physical Exam  Vitals and nursing note reviewed.   Constitutional:       Appearance: Normal appearance.   HENT:      Head: Normocephalic and atraumatic.   Cardiovascular:      Rate and Rhythm: Normal rate and regular rhythm.   Pulmonary:      Effort: Pulmonary effort is normal. No respiratory distress.   Abdominal:      General: Abdomen is flat. There is no distension.      Palpations: Abdomen is soft. There is no mass.      Tenderness: There is abdominal tenderness (epigastric and right upper quadrant tenderness to deep palpation).      Hernia: No hernia is present.   Musculoskeletal:      Right lower leg: No edema.      Left lower leg: No edema.   Skin:     General: Skin is warm and dry.   Neurological:      General: No focal deficit  [LVEF ___%] : LVEF [unfilled]% [___] : [unfilled] present.      Mental Status: She is alert and oriented to person, place, and time.   Psychiatric:         Mood and Affect: Mood normal.         Behavior: Behavior normal.            I have reviewed all pertinent lab results within the past 24 hours.    Significant Diagnostics:  I have reviewed all pertinent imaging results/findings within the past 24 hours.     [___] : [unfilled]

## 2024-02-04 NOTE — ANESTHESIA PROCEDURE NOTES
Intubation    Date/Time: 2/4/2024 8:12 AM    Performed by: Bryce Vizcarra CRNA  Authorized by: Jay Barber MD    Intubation:     Induction:  Intravenous    Intubated:  Postinduction    Mask Ventilation:  Easy with oral airway    Attempts:  1    Attempted By:  CRNA    Method of Intubation:  Video laryngoscopy    Blade:  Elizabeth 3    Laryngeal View Grade: Grade I - full view of cords      Difficult Airway Encountered?: No      Complications:  None    Airway Device:  Oral endotracheal tube    Airway Device Size:  7.0    Style/Cuff Inflation:  Cuffed (inflated to minimal occlusive pressure)    Inflation Amount (mL):  5    Tube secured:  21    Secured at:  The lips    Placement Verified By:  Capnometry    Complicating Factors:  None    Findings Post-Intubation:  BS equal bilateral and atraumatic/condition of teeth unchanged

## 2024-02-04 NOTE — HPI
29 year old obese female who has no diagnosed medical problems who presents with right upper quadrant and epigastric abdominal pain, nausea, vomiting.     Vitals and labs largely normal on admission; US with 1.4 cm gallstone, distended gallbladder without over signs of cholecystitis. Pain refractory to pain medication in ED so general surgery consulted for consideration of inpatient cholecystectomy for biliary colic

## 2024-02-04 NOTE — NURSING
Pt returned to unit from surgery. Pt in no acute distress. Pt denies pain at this time. Vitals per chart.

## 2024-02-04 NOTE — ANESTHESIA PREPROCEDURE EVALUATION
02/04/2024  Evan Teresa is a 29 y.o., female.  To undergo Procedure(s) (LRB):  ROBOTIC CHOLECYSTECTOMY (N/A)     Denies CP/SOB/GERD/MI/CVA/URI symptoms.  METS > 4  NPO > 8    Past Medical History:  History reviewed. No pertinent past medical history.    Past Surgical History:  History reviewed. No pertinent surgical history.    Social History:  Social History     Socioeconomic History    Marital status: Single   Tobacco Use    Smoking status: Never    Smokeless tobacco: Never   Substance and Sexual Activity    Alcohol use: Yes     Comment: occ    Drug use: Never    Sexual activity: Yes     Partners: Male       Medications:  No current facility-administered medications on file prior to encounter.     No current outpatient medications on file prior to encounter.       Allergies:  Review of patient's allergies indicates:  No Known Allergies    Active Problems:  There is no problem list on file for this patient.      Diagnostic Studies:   Latest Reference Range & Units 02/04/24 01:19   WBC 3.90 - 12.70 K/uL 8.00   RBC 4.00 - 5.40 M/uL 5.55 (H)   Hemoglobin 12.0 - 16.0 g/dL 12.1   Hematocrit 37.0 - 48.5 % 40.4   MCV 82 - 98 fL 73 (L)   MCH 27.0 - 31.0 pg 21.8 (L)   MCHC 32.0 - 36.0 g/dL 30.0 (L)   RDW 11.5 - 14.5 % 18.7 (H)   Platelet Count 150 - 450 K/uL 522 (H)   MPV 9.2 - 12.9 fL 8.8 (L)   Gran % 38.0 - 73.0 % 59.7   Lymph % 18.0 - 48.0 % 30.5   Mono % 4.0 - 15.0 % 5.0   Eos % 0.0 - 8.0 % 4.1   Basophil % 0.0 - 1.9 % 0.6   Immature Granulocytes 0.0 - 0.5 % 0.1   Gran # (ANC) 1.8 - 7.7 K/uL 4.8   Lymph # 1.0 - 4.8 K/uL 2.4   Mono # 0.3 - 1.0 K/uL 0.4   Eos # 0.0 - 0.5 K/uL 0.3   Baso # 0.00 - 0.20 K/uL 0.05   Immature Grans (Abs) 0.00 - 0.04 K/uL 0.01   nRBC 0 /100 WBC 0   Differential Method  Automated      Latest Reference Range & Units 02/04/24 01:19   Sodium 136 - 145 mmol/L 141   Potassium 3.5 - 5.1 mmol/L 3.8   Chloride 95 - 110 mmol/L 106   CO2 23 - 29 mmol/L 24   Anion Gap 8 - 16 mmol/L 11   BUN 6 -  20 mg/dL 10   Creatinine 0.5 - 1.4 mg/dL 0.8   eGFR >60 mL/min/1.73 m^2 >60   Glucose 70 - 110 mg/dL 115 (H)   Calcium 8.7 - 10.5 mg/dL 9.5   ALP 55 - 135 U/L 98   PROTEIN TOTAL 6.0 - 8.4 g/dL 8.3   Albumin 3.5 - 5.2 g/dL 4.2   BILIRUBIN TOTAL 0.1 - 1.0 mg/dL 0.4   AST 10 - 40 U/L 23   ALT 10 - 44 U/L 31   Lipase 4 - 60 U/L 33     24 Hour Vitals:  Temp:  [36.8 °C (98.3 °F)-37.2 °C (98.9 °F)] 36.9 °C (98.5 °F)  Pulse:  [60-67] 60  Resp:  [16-19] 19  SpO2:  [97 %-100 %] 99 %  BP: (116-131)/(57-78) 116/57   See Nursing Charting For Additional Vitals      Pre-op Assessment    I have reviewed the Patient Summary Reports.     I have reviewed the Nursing Notes. I have reviewed the NPO Status.   I have reviewed the Medications.     Review of Systems  Anesthesia Hx:  No problems with previous Anesthesia               Denies Personal Hx of Anesthesia complications.                    Social:  Non-Smoker, Alcohol Use       Cardiovascular:  Cardiovascular Normal Exercise tolerance: good                                           Pulmonary:  Pulmonary Normal                       Hepatic/GI:  Hepatic/GI Normal       Acute cholecystitis          Neurological:  Neurology Normal                                      Endocrine:        Morbid Obesity / BMI > 40      Physical Exam  General: Well nourished, Cooperative, Alert and Oriented    Airway:  Mallampati: II / II  Mouth Opening: Small, but > 3cm  TM Distance: Normal  Neck ROM: Normal ROM    Dental:  Intact    Chest/Lungs:  Clear to auscultation, Normal Respiratory Rate    Heart:  Rate: Normal  Rhythm: Regular Rhythm  Sounds: Normal        Anesthesia Plan  Type of Anesthesia, risks & benefits discussed:    Anesthesia Type: Gen ETT  Intra-op Monitoring Plan: Standard ASA Monitors  Post Op Pain Control Plan: multimodal analgesia  Induction:  IV  Airway Plan: Direct and Video, Post-Induction  Informed Consent: Informed consent signed with the Patient and all parties understand the  risks and agree with anesthesia plan.  All questions answered. Patient consented to blood products? Yes  ASA Score: 3  Day of Surgery Review of History & Physical: H&P Update referred to the surgeon/provider.  Anesthesia Plan Notes:   GA with OETT  Standard ASA monitors  Recovery in PACU  PONV: 3    Ready For Surgery From Anesthesia Perspective.     .

## 2024-02-05 NOTE — DISCHARGE SUMMARY
[facility]  General Surgery  Discharge Summary      Patient Name: Evan Teresa  MRN: [id number]  Admission Date: 2/4/2024  Hospital Length of Stay: 0 days  Discharge Date and Time: 2/4/2024  3:27 PM  Attending Physician: Misty att. providers found   Discharging Provider: Francy Meeks MD  Primary Care Provider: Misty, Primary Doctor    HPI:   29 year old obese female who has no diagnosed medical problems who presents with right upper quadrant and epigastric abdominal pain, nausea, vomiting.     Vitals and labs largely normal on admission; US with 1.4 cm gallstone, distended gallbladder without over signs of cholecystitis. Pain refractory to pain medication in ED so general surgery consulted for consideration of inpatient cholecystectomy for biliary colic     Procedure(s) (LRB):  ROBOTIC CHOLECYSTECTOMY (N/A)      Indwelling Lines/Drains at time of discharge:   Lines/Drains/Airways       None                 Hospital Course: Patient admitted for refractory biliary colic and taken for above procedure which she tolerated well. She was discharged post operatively after awakening well from anesthesia. She will have post operative follow up.     Goals of Care Treatment Preferences:  Code Status: Full Code      Consults:     Significant Diagnostic Studies: N/A    Pending Diagnostic Studies:       Procedure Component Value Units Date/Time    Specimen to Pathology, Surgery General Surgery [[id number]] Collected: 02/04/24 0930    Order Status: Sent Lab Status: In process Updated: 02/04/24 0931    Specimen: Tissue           Final Active Diagnoses:    Diagnosis Date Noted POA    PRINCIPAL PROBLEM:  Acute cholecystitis [K81.0] 02/04/2024 Yes      Problems Resolved During this Admission:      Discharged Condition: good    Disposition: Home or Self Care    Follow Up:   Follow-up Information       [facility] -. Schedule an appointment as soon as possible for a visit in 1 day.    Why: For reevaluation,  AND to establish primary if you don't have a PCP  Contact information:  [address] [facility] [facility] [address]  [phone]               [facility] GENERAL SURGERY. Schedule an appointment as soon as possible for a visit in 1 day.    Specialty: General Surgery  Why: For further evaluation, For more definitive management of your symptoms  Contact information:  [address] [facility] [place] [address] [phone]             [facility]. Go in 1 day.    Specialties: Surgical Oncology, Orthopedic Surgery, Genetics, Physical Medicine and Rehabilitation, Occupational Therapy, Radiology  Why: as an alternative to specialist evaluation  Contact information:  [address] [facility] [address]  [phone]               [facility] Emergency Dept. Go to .    Specialty: Emergency Medicine  Why: If symptoms worsen or new symptoms develop  Contact information:  [address] [facility] [place] [address] [phone]                         Patient Instructions:      Ambulatory referral/consult to General Surgery   Standing Status: Future   Referral Priority: Routine Referral Type: Consultation   Referral Reason: Specialty Services Required   Requested Specialty: General Surgery   Number of Visits Requested: 1     Diet Adult Regular     Lifting restrictions   Order Comments: No lifting objects over 15 lbs for 4-6 weeks     Notify your health care provider if you experience any of the following:  temperature >100.4     Notify your health care provider if you experience any of the following:  persistent nausea and vomiting or diarrhea     Notify your health care provider if you experience any of the following:  severe uncontrolled pain     Notify your health care provider if you experience any of the following:  redness, tenderness, or signs of infection (pain, swelling, redness, odor or green/yellow  discharge around incision site)     No dressing needed     Activity as tolerated     Shower on day dressing removed (No bath)   Order Comments: Do not submerge or soak incision. Do not scrub incision. You may allow water and gentle soap to run over the incision while showering.     Medications:  Transfer Medications (for Discharge Readmit only):   No current facility-administered medications for this encounter.     Current Outpatient Medications   Medication Sig Dispense Refill    acetaminophen (TYLENOL) 500 MG tablet Take 2 tablets (1,000 mg total) by mouth every 8 (eight) hours. for 5 days  0    ondansetron (ZOFRAN) 4 MG tablet Take 2 tablets (8 mg total) by mouth every 6 (six) hours as needed for Nausea. 30 tablet 0    oxyCODONE (ROXICODONE) 5 MG immediate release tablet Take 1 tablet (5 mg total) by mouth every 4 (four) hours as needed for Pain. 12 tablet 0     Time spent on the discharge of patient: 30 minutes    Francy Meeks MD  General Surgery  [facility]

## 2024-02-06 LAB
FINAL PATHOLOGIC DIAGNOSIS: NORMAL
GROSS: NORMAL
Lab: NORMAL

## 2024-02-19 ENCOUNTER — TELEPHONE (OUTPATIENT)
Dept: SURGERY | Facility: CLINIC | Age: 29
End: 2024-02-19

## 2024-02-19 NOTE — TELEPHONE ENCOUNTER
----- Message from Juanis Almonte sent at 2/17/2024  9:55 AM CST -----  Contact: [phone]  Pt states she had sx a couple of weeks ago and wants to know when she needs to f/u with Dr Martinez? Please call pt          02/19/2024                [phone]  Attempted to contact patient regarding the above message, via the language line, (Vic ID# [id number]). No answer. Left voicemail.

## 2024-02-20 ENCOUNTER — TELEPHONE (OUTPATIENT)
Dept: SURGERY | Facility: CLINIC | Age: 29
End: 2024-02-20

## 2024-02-20 NOTE — TELEPHONE ENCOUNTER
----- Message from Medhat Mullins sent at 2/19/2024  3:33 PM CST -----  Contact: PT  Type:  Sooner Apoointment Request    Caller is requesting a sooner appointment.  Caller declined first available appointment listed below.  Caller will not accept being placed on the waitlist and is requesting a message be sent to doctor.  Name of Caller:PT   When is the first available appointment? N/A  Symptoms:FOLLOW UP  Would the patient rather a call back or a response via [facility]? Call  Best Call Back Number: [phone]  Additional Information: return call for Leola Su MA         02/20/2024                1148  Spoke to patient via the language line (Lan ID#[id number]) regarding the above message. Informed patient that Dr. Martinez was the surgeon on call that performed her surgery. However, she is to follow up with a general surgeon at the [facility] location. Provided patient with the office phone number and location. Patient verbalized understanding.

## 2024-02-27 ENCOUNTER — OFFICE VISIT (OUTPATIENT)
Dept: SURGERY | Facility: CLINIC | Age: 29
End: 2024-02-27

## 2024-02-27 VITALS
WEIGHT: 266.75 LBS | BODY MASS INDEX: 46.68 KG/M2 | HEART RATE: 76 BPM | DIASTOLIC BLOOD PRESSURE: 70 MMHG | SYSTOLIC BLOOD PRESSURE: 101 MMHG

## 2024-02-27 DIAGNOSIS — K81.0 ACUTE CHOLECYSTITIS: Primary | ICD-10-CM

## 2024-02-27 PROCEDURE — 99999 PR PBB SHADOW E&M-EST. PATIENT-LVL III: CPT | Mod: PBBFAC,,, | Performed by: SURGERY

## 2024-02-27 PROCEDURE — 99024 POSTOP FOLLOW-UP VISIT: CPT | Mod: ,,, | Performed by: SURGERY

## 2024-02-27 PROCEDURE — 99213 OFFICE O/P EST LOW 20 MIN: CPT | Mod: PBBFAC | Performed by: SURGERY

## 2024-02-27 NOTE — PROGRESS NOTES
Surgery Clinic Note    Evan Teresa is a 29 y.o. year old female in clinic today for follow up of robotic cholecystectomy approx 1 month ago. Doing well.  No f/c/n/v/sob/cp    ROS:  Negative except above    Pathology:  Gallbladder, cholecystectomy:   Chronic cholecystitis and cholelithiasis    PE:  Vitals:    02/27/24 0952   BP: 101/70   Pulse: 76       NAD  No belabored breathing  Abd soft nt nd  Incisions c/d/I    A/P:  Evan Teresa is a 29 y.o. year old female s/p robotic cholecystectomy    -ok to return to work next week w no restrictions    Lan Gayle  General Surgery - [facility]  2/27/2024

## (undated) DEVICE — NDL HYPO REG 25G X 1 1/2

## (undated) DEVICE — SYR ONLY LUER LOCK 20CC

## (undated) DEVICE — SYR 10CC LUER LOCK

## (undated) DEVICE — HEMOCLIPS GREEN

## (undated) DEVICE — TUBING INSUFFLATION W/LUER LOK

## (undated) DEVICE — STRIP MEDI WND CLSR 1/2X4IN

## (undated) DEVICE — SOL NACL 0.9% INJ 500ML BG

## (undated) DEVICE — GLOVE BIOGEL 7.5

## (undated) DEVICE — DRESSING ADH ISLAND 2.5 X 3

## (undated) DEVICE — GOWN NONREINF SET-IN SLV XL

## (undated) DEVICE — OBTURATOR BLADELESS 8MM XI CLR

## (undated) DEVICE — SCISSOR HOT SHEARS XI

## (undated) DEVICE — SUT VICRYL 4-0 ANTIBACT

## (undated) DEVICE — CHLORAPREP W TINT 26ML APPL

## (undated) DEVICE — DRAPE COLUMN DAVINCI XI

## (undated) DEVICE — BLANKET UPPER BODY 78.7X29.9IN

## (undated) DEVICE — NDL ECLIPSE SAFETY 18GX1-1/2IN

## (undated) DEVICE — COVER LIGHT HANDLE

## (undated) DEVICE — SUT VICRYL+ 27 UR-6 VIOL

## (undated) DEVICE — SOL CLEARIFY VISUALIZATION LAP

## (undated) DEVICE — IRRIGATOR ENDOWRIST XI SUCTION

## (undated) DEVICE — CONTAINER SPECIMEN 4OZ

## (undated) DEVICE — NDL INSUF ULTRA VERESS 120MM

## (undated) DEVICE — DRAPE THREE-QUARTER 53X77IN

## (undated) DEVICE — TROCAR ENDOPATH XCEL 11MM 10CM

## (undated) DEVICE — BAG TISSUE RETRIEVAL 5MM

## (undated) DEVICE — SOL IRR SOD CHL .9% POUR

## (undated) DEVICE — DRAPE ARM DAVINCI XI

## (undated) DEVICE — SEAL UNIVERSAL 5MM-8MM XI

## (undated) DEVICE — Device

## (undated) DEVICE — APPLIER MEDIUM LARGE CLIP

## (undated) DEVICE — FORCE BIPOLAR DA VINCI

## (undated) DEVICE — ELECTRODE REM PLYHSV RETURN 9

## (undated) DEVICE — SUT MONOCRYL 4.0 PS2 CP496G

## (undated) DEVICE — PACK ENDOSCOPY GENERAL

## (undated) DEVICE — PAD PINK TRENDELENBURG POS XL

## (undated) DEVICE — HOOK PERM MONOPOLAR CAUTERY